# Patient Record
(demographics unavailable — no encounter records)

---

## 2025-03-20 NOTE — HISTORY OF PRESENT ILLNESS
[FreeTextEntry1] : 65-year-old  LMP 2010 for annual exam.  Patient states Revaree did not really work not really complaining of any vaginal dryness or pain now.  Declines vaginal estrogen [postmenopausal] : postmenopausal [N] : Patient is not sexually active [Mammogramdate] : 5/24 [BreastSonogramDate] : 5/24 [PapSmeardate] : 2/24 [TextBox_31] : Negative HPV [BoneDensityDate] : 4/23 [ColonoscopyDate] : 4/24 [LMPDate] : 2010 [PGHxTotal] : 2 [[facility]] : 2 [[facility]] : 2

## 2025-03-20 NOTE — PLAN
[FreeTextEntry1] : 65-year-old with normal menopausal exam Mammo sono Pap DEXA Self breast exam urged If patient wishes to treat vaginal dryness she is to call

## 2025-03-20 NOTE — PHYSICAL EXAM
[FreeTextEntry2] : LEANDRA MORE [Appropriately responsive] : appropriately responsive [Alert] : alert [No Acute Distress] : no acute distress [Soft] : soft [Non-tender] : non-tender [Non-distended] : non-distended [No Mass] : no mass [Examination Of The Breasts] : a normal appearance [No Masses] : no breast masses were palpable [Labia Majora] : normal [Labia Minora] : normal [Atrophy] : atrophy [Normal] : normal [Uterine Adnexae] : normal [FreeTextEntry9] : Guaiac negative